# Patient Record
Sex: MALE | Race: BLACK OR AFRICAN AMERICAN | NOT HISPANIC OR LATINO
[De-identification: names, ages, dates, MRNs, and addresses within clinical notes are randomized per-mention and may not be internally consistent; named-entity substitution may affect disease eponyms.]

---
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
## 2024-02-05 ENCOUNTER — APPOINTMENT (OUTPATIENT)
Dept: INTERNAL MEDICINE | Facility: CLINIC | Age: 19
End: 2024-02-05

## 2024-02-05 ENCOUNTER — OUTPATIENT (OUTPATIENT)
Dept: OUTPATIENT SERVICES | Facility: HOSPITAL | Age: 19
LOS: 1 days | End: 2024-02-05

## 2024-05-19 ENCOUNTER — EMERGENCY (EMERGENCY)
Facility: HOSPITAL | Age: 19
LOS: 0 days | Discharge: HOME | End: 2024-05-20
Attending: EMERGENCY MEDICINE
Payer: COMMERCIAL

## 2024-05-19 VITALS
OXYGEN SATURATION: 100 % | SYSTOLIC BLOOD PRESSURE: 142 MMHG | WEIGHT: 223.99 LBS | HEART RATE: 97 BPM | TEMPERATURE: 97 F | DIASTOLIC BLOOD PRESSURE: 90 MMHG | RESPIRATION RATE: 18 BRPM

## 2024-05-19 DIAGNOSIS — A08.4 VIRAL INTESTINAL INFECTION, UNSPECIFIED: ICD-10-CM

## 2024-05-19 DIAGNOSIS — Z91.018 ALLERGY TO OTHER FOODS: ICD-10-CM

## 2024-05-19 DIAGNOSIS — E87.6 HYPOKALEMIA: ICD-10-CM

## 2024-05-19 DIAGNOSIS — R11.2 NAUSEA WITH VOMITING, UNSPECIFIED: ICD-10-CM

## 2024-05-19 DIAGNOSIS — N43.3 HYDROCELE, UNSPECIFIED: ICD-10-CM

## 2024-05-19 DIAGNOSIS — R19.7 DIARRHEA, UNSPECIFIED: ICD-10-CM

## 2024-05-19 DIAGNOSIS — N50.812 LEFT TESTICULAR PAIN: ICD-10-CM

## 2024-05-19 LAB
ALBUMIN SERPL ELPH-MCNC: 4.4 G/DL — SIGNIFICANT CHANGE UP (ref 3.3–5)
ALP SERPL-CCNC: 103 U/L — SIGNIFICANT CHANGE UP (ref 40–120)
ALT FLD-CCNC: 30 U/L — SIGNIFICANT CHANGE UP (ref 12–78)
ANION GAP SERPL CALC-SCNC: 7 MMOL/L — SIGNIFICANT CHANGE UP (ref 5–17)
AST SERPL-CCNC: 19 U/L — SIGNIFICANT CHANGE UP (ref 15–37)
BASOPHILS # BLD AUTO: 0.01 K/UL — SIGNIFICANT CHANGE UP (ref 0–0.2)
BASOPHILS NFR BLD AUTO: 0.2 % — SIGNIFICANT CHANGE UP (ref 0–2)
BILIRUB SERPL-MCNC: 0.7 MG/DL — SIGNIFICANT CHANGE UP (ref 0.2–1.2)
BUN SERPL-MCNC: 9 MG/DL — SIGNIFICANT CHANGE UP (ref 7–23)
CALCIUM SERPL-MCNC: 9.7 MG/DL — SIGNIFICANT CHANGE UP (ref 8.5–10.1)
CHLORIDE SERPL-SCNC: 106 MMOL/L — SIGNIFICANT CHANGE UP (ref 96–108)
CO2 SERPL-SCNC: 28 MMOL/L — SIGNIFICANT CHANGE UP (ref 22–31)
CREAT SERPL-MCNC: 1.11 MG/DL — SIGNIFICANT CHANGE UP (ref 0.5–1.3)
EGFR: 98 ML/MIN/1.73M2 — SIGNIFICANT CHANGE UP
EOSINOPHIL # BLD AUTO: 0.01 K/UL — SIGNIFICANT CHANGE UP (ref 0–0.5)
EOSINOPHIL NFR BLD AUTO: 0.2 % — SIGNIFICANT CHANGE UP (ref 0–6)
GLUCOSE SERPL-MCNC: 101 MG/DL — HIGH (ref 70–99)
HCT VFR BLD CALC: 44.9 % — SIGNIFICANT CHANGE UP (ref 39–50)
HGB BLD-MCNC: 14.7 G/DL — SIGNIFICANT CHANGE UP (ref 13–17)
IMM GRANULOCYTES NFR BLD AUTO: 0.2 % — SIGNIFICANT CHANGE UP (ref 0–0.9)
LYMPHOCYTES # BLD AUTO: 0.52 K/UL — LOW (ref 1–3.3)
LYMPHOCYTES # BLD AUTO: 9.2 % — LOW (ref 13–44)
MCHC RBC-ENTMCNC: 27.3 PG — SIGNIFICANT CHANGE UP (ref 27–34)
MCHC RBC-ENTMCNC: 32.7 GM/DL — SIGNIFICANT CHANGE UP (ref 32–36)
MCV RBC AUTO: 83.5 FL — SIGNIFICANT CHANGE UP (ref 80–100)
MONOCYTES # BLD AUTO: 0.19 K/UL — SIGNIFICANT CHANGE UP (ref 0–0.9)
MONOCYTES NFR BLD AUTO: 3.4 % — SIGNIFICANT CHANGE UP (ref 2–14)
NEUTROPHILS # BLD AUTO: 4.93 K/UL — SIGNIFICANT CHANGE UP (ref 1.8–7.4)
NEUTROPHILS NFR BLD AUTO: 86.8 % — HIGH (ref 43–77)
PLATELET # BLD AUTO: 163 K/UL — SIGNIFICANT CHANGE UP (ref 150–400)
POTASSIUM SERPL-MCNC: 3.3 MMOL/L — LOW (ref 3.5–5.3)
POTASSIUM SERPL-SCNC: 3.3 MMOL/L — LOW (ref 3.5–5.3)
PROT SERPL-MCNC: 8.2 GM/DL — SIGNIFICANT CHANGE UP (ref 6–8.3)
RBC # BLD: 5.38 M/UL — SIGNIFICANT CHANGE UP (ref 4.2–5.8)
RBC # FLD: 12.3 % — SIGNIFICANT CHANGE UP (ref 10.3–14.5)
SODIUM SERPL-SCNC: 141 MMOL/L — SIGNIFICANT CHANGE UP (ref 135–145)
WBC # BLD: 5.67 K/UL — SIGNIFICANT CHANGE UP (ref 3.8–10.5)
WBC # FLD AUTO: 5.67 K/UL — SIGNIFICANT CHANGE UP (ref 3.8–10.5)

## 2024-05-19 PROCEDURE — 96374 THER/PROPH/DIAG INJ IV PUSH: CPT | Mod: XU

## 2024-05-19 PROCEDURE — 93975 VASCULAR STUDY: CPT

## 2024-05-19 PROCEDURE — 36415 COLL VENOUS BLD VENIPUNCTURE: CPT

## 2024-05-19 PROCEDURE — 99284 EMERGENCY DEPT VISIT MOD MDM: CPT

## 2024-05-19 PROCEDURE — 81003 URINALYSIS AUTO W/O SCOPE: CPT

## 2024-05-19 PROCEDURE — 87086 URINE CULTURE/COLONY COUNT: CPT

## 2024-05-19 PROCEDURE — 76870 US EXAM SCROTUM: CPT

## 2024-05-19 PROCEDURE — 85025 COMPLETE CBC W/AUTO DIFF WBC: CPT

## 2024-05-19 PROCEDURE — 99285 EMERGENCY DEPT VISIT HI MDM: CPT | Mod: 25

## 2024-05-19 PROCEDURE — 80053 COMPREHEN METABOLIC PANEL: CPT

## 2024-05-19 RX ORDER — SODIUM CHLORIDE 9 MG/ML
1000 INJECTION INTRAMUSCULAR; INTRAVENOUS; SUBCUTANEOUS ONCE
Refills: 0 | Status: COMPLETED | OUTPATIENT
Start: 2024-05-19 | End: 2024-05-19

## 2024-05-19 RX ORDER — ONDANSETRON 8 MG/1
4 TABLET, FILM COATED ORAL ONCE
Refills: 0 | Status: COMPLETED | OUTPATIENT
Start: 2024-05-19 | End: 2024-05-19

## 2024-05-19 RX ADMIN — SODIUM CHLORIDE 1000 MILLILITER(S): 9 INJECTION INTRAMUSCULAR; INTRAVENOUS; SUBCUTANEOUS at 23:07

## 2024-05-19 RX ADMIN — ONDANSETRON 4 MILLIGRAM(S): 8 TABLET, FILM COATED ORAL at 23:06

## 2024-05-19 NOTE — ED ADULT NURSE NOTE - OBJECTIVE STATEMENT
17 y/o male pt presents to ED c/o left testicular pain x 2 weeks. pt also endorsing nausea, vomiting, diarrhea. reports brother at home with similar GI symptoms. pt denies fevers, chills, chest pain, SOB, hematuria.

## 2024-05-19 NOTE — ED PROVIDER NOTE - CARE PROVIDER_API CALL
Gordo Avery Aquiles  Urology  [address] [place] [address], [address]  [place] [address]  Phone: [phone]  Fax: [phone]  Follow Up Time:

## 2024-05-19 NOTE — ED PROVIDER NOTE - GENITOURINARY, MLM
Normal genital exam, no signs of torsion, no swelling, no rash, no penile discharge, no signs of trauma.

## 2024-05-19 NOTE — ED PROVIDER NOTE - OBJECTIVE STATEMENT
18-year-old male no significant past medical history who presents with chief complaint of left testicular pain.  Patient states that symptoms have been ongoing for 2 weeks.  Denies any trauma.  Additionally denies any rash, swelling, or previous occurrence.  Patient also endorses a few day history of nausea vomiting diarrhea, brother with similar symptoms.  Denies fever, chills, or any other symptoms at this time.  No medication taken prior to arrival.

## 2024-05-19 NOTE — ED PROVIDER NOTE - CLINICAL SUMMARY MEDICAL DECISION MAKING FREE TEXT BOX
Ultrasound demonstrates small bilateral hydroceles, no acute findings.  Labs demonstrate mild hypokalemia 3.3 to be repleted.  Patient received IV fluids, Zofran in department with improvement in symptoms.

## 2024-05-19 NOTE — ED PROVIDER NOTE - NSFOLLOWUPINSTRUCTIONS_ED_ALL_ED_FT
Scrotal Pain    WHAT YOU NEED TO KNOW:    What do I need to know about scrotal pain? Scrotal pain can happen at any age. The cause of scrotal pain can range from a minor injury to a serious medical condition. It is very important to seek immediate care if you have scrotal pain. The pain may be a warning sign of a serious condition that will need treatment. Without immediate care, you may be at increased risk for losing a testicle or being sterile (not having children).    What may cause scrotal pain?    Torsion (twisting) of the testicle, cord that carries sperm from the testicle, or tissue attached to the testicle    An infection of the testicle or other area in the scrotum    A hydrocele (fluid buildup around the testicle) or varicocele (blood backup in veins in the scrotum)    An inguinal hernia (tissue pushed out of place in your groin)    Rubio gangrene (tissue death of the area between the scrotum and anus)    A urinary tract infection or stone that is passing, or an infected appendix    An injury in your groin or scrotum  What are the warning signs of a serious medical problem? Seek care immediately if you have any of the following:    Pain that starts suddenly or is severe    Swelling in your scrotum or groin, especially if you also have severe pain or are vomiting    Red or black patches of skin on your scrotum or area between your penis and anus    Blisters anywhere in your groin or scrotum    A fever  How is the cause of scrotal pain diagnosed? Your healthcare provider will examine you and ask about your pain. Tell the provider when the pain started and how long it lasts. Your provider will ask if pain started in another area and moved to your scrotum. The pain may also have moved from your scrotum to another area. Tell your provider if you have pain during exercise or if you had an injury to your groin. Also tell your provider if you have any problems urinating or if any discharge came out of your penis. Your provider may also ask about your sexual activity.    Blood tests may be used to check for signs of infection.    Ultrasound pictures may show a problem with your testicles or tissues in your scrotum. An ultrasound may also show kidney stones or other problems that may be causing your pain.  How is scrotal pain treated? Treatment will depend on the cause of your pain:    Prescription pain medicine may be given. Ask your healthcare provider how to take this medicine safely. Some prescription pain medicines contain acetaminophen. Do not take other medicines that contain acetaminophen without talking to your healthcare provider. Too much acetaminophen may cause liver damage. Prescription pain medicine may cause constipation. Ask your healthcare provider how to prevent or treat constipation.    NSAIDs, such as ibuprofen, help decrease swelling, pain, and fever. This medicine is available with or without a doctor's order. NSAIDs can cause stomach bleeding or kidney problems in certain people. If you take blood thinner medicine, always ask your healthcare provider if NSAIDs are safe for you. Always read the medicine label and follow directions.    Antibiotics are used to treat a bacterial infection.    Surgery may be needed to untwist the testicle, or cord, or to remove dead or infected tissue.  What can I do to manage my symptoms?    Wear a support device, if directed. A support device, such as a jock strap, can help keep your scrotum lifted and supported. This can help decrease pain.    Apply ice to your scrotum. Ice helps decrease pain and swelling. Use an ice pack, or put crushed ice in a plastic bag. Cover the pack or bag with a towel before you apply it to your scrotum. Apply ice for 15 to 20 minutes every hour, or as directed.  When should I seek immediate care?    You have any warning signs of a serious problem.    You have pain or swelling that starts or gets worse quickly.    You have skin changes in your scrotum, such as a dark patch.    You have a fever.  When should I contact my healthcare provider?    Your pain does not get better, even after you take pain medicine.    You have new or worsening pain.    You have questions or concerns about your condition or care.  CARE AGREEMENT:    You have the right to help plan your care. Learn about your health condition and how it may be treated. Discuss treatment options with your healthcare providers to decide what care you want to receive. You always have the right to refuse treatment.    © [employer] 1973, 2024    	  back to top            © [employer] 1973, 2024

## 2024-05-19 NOTE — ED ADULT TRIAGE NOTE - CHIEF COMPLAINT QUOTE
complains of intermittent testicular pain for past 2 weeks. denies injury. reports pain randomly comes and goes. not painful to touch. no swelling

## 2024-05-19 NOTE — ED PROVIDER NOTE - PATIENT PORTAL LINK FT
You can access the FollowMyHealth Patient Portal offered by [facility] by registering at the following website: [email or web address]. By joining [employer]’s FollowMyHealth portal, you will also be able to view your health information using other applications (apps) compatible with our system.

## 2024-05-19 NOTE — ED ADULT NURSE NOTE - SUICIDE SCREENING QUESTION 3
Follow-up - Radiation Oncology   Sabra Kapadia 1938 79 y o  female [id number]      History of Present Illness   Cancer Staging  Malignant neoplasm of upper-outer quadrant of left breast in female, estrogen receptor positive ([facility] [address] )  Staging form: Breast, AJCC 8th Edition  - Pathologic: Stage IIIA (pT4b, pN1a, cM0, G2, ER+, KY+, HER2-) - Unsigned  Method of lymph node assessment: Axillary lymph node dissection  Histologic grading system: 3 grade system      Sabra Kapadia is a 79y o  year old female with a history of left breast cancer   Status post lumpectomy and adjuvant radiation in 1992, followed by local recurrence in 1994 for which she underwent mastectomy, lymph node dissection but no additional radiation  In August 2020, she was found to have left chest wall recurrence and underwent surgical resection on 11/17/2020 revealing pT4b N1a M0 disease, ER positive KY positive HER2 negative, with a focally positive anterior subcutaneous margin  She was referred for additional adjuvant radiation therapy and was seen initially in consultation in January 2021  However, due to complications with persistent seroma requiring multiple drains, she never initiated treatment  She presents today having her last drain removed approximately 3 weeks ago  Interval history as below:      Interval History:  Patient returns to radiation oncology for limited consult and CT simulation for recurrent left breast cancer  Last consult with Dr Holly Oden on 1/14/21    79year old female with stage IIIA left breast cancer status post resection and axillary lymph node dissection  She had left breast cancer in 1992 status post lumpectomy and adjuvant radiation followed by mastectomy, lymph node dissection and reconstruction in 1994  No further radiation at that time per patient    Pathology from most recent resection is significant for +3/5 lymph nodes with GRICELDA and positive anterior subcutaneous margin in the 9-12:00 position and close but negative deep margin  Tumor cells are ER/FL (+) and [id number] (-)  Aromatase inhibitor therapy planned  Dr Jasson Paez offered the patient adjuvant radiation to the chest wall and draining lymph nodes  Plan a dose of 45-50Gy with possible boost to (+) margin areas to 60-66Gy depending on normal tissue tolerance and ability to locate this region  2/8/21 Pt attempted CT simulation but unable to raise left arm, she was referred to Physical therapy and asked to return when ROM improved  2/17/21 PT evaluation for LCW edema, lymphedema, seroma, decreased left shoulder ROM  Plan for PT 2x/week 2/17 - 4/17/21    3/4/21 Dr Javire Espinoza - aspirated 400 cc serosang fluid    3/22/21 Dr Javier Espinoza - recurrent seroma 80 cc aspirated  Refer to IR for drain placement  4/1/21 IR - Drain placed for left breast recurrent seroma  300 mL drained  4/23/21 IR - drain removal    5/14/21 IR - drain placement     6/3/21 IR - drain repositioned    6/21/21 Dr Dm Julien follow-up  Currently on adjuvant hormonal therapy with anastrozole with excellent tolerance  7/7/21 IR - drain tube check -  There remains small seroma cavity  Drainage catheter kept in place  7/19/21 Dr Javier Espinoza -   Still has her IR drain that was putting out small amounts of serous fluid  Hopefully she will have the drain removed quickly and can start radiation therapy  Otherwise she has no complaints referable to her breast   She has good range of motion    Will see her back in 6 months     7/27/21 IR - drain removed         12/20/21 Dr Dm Julien follow-up  1/19/22 Dr Javier Espinoza follow-up      Historical Information   Oncology History   Malignant neoplasm of upper-outer quadrant of left breast in female, estrogen receptor positive ([facility] [address] )   1992 Initial Diagnosis    Left breast cancer  Lumpectomy  RT      1994 Surgery    Left breast cancer recurrence  Mastectomy with immediate reconstruction     8/31/2020 Biopsy    Left breast ultrasound-guided biopsy  A  1 - 2 o'clock  Invasive mammary carcinoma of no special type   Grade 2    OK 90  HER2 1+  Lymphovascular invasion not identified    B  Left axillary lymph node  Invasive mammary carcinoma of no special type  Grade 2    Concordant  No residual lymph node is identified in specimen B, favoring axillary extension of invasive carcinoma over lymph node metastasis  Breast mass measured 2 8cm on US and involved skin  Axillary mass measured 1 4cm  Right clear       11/17/2020 Surgery    Left breast ultrasound-guided lumpectomy with HUGO  directed axillary dissection  Invasive carcinoma of no special type (ductal)  Grade 2  3 2 cm  Invasive carcinoma directly invades into the dermis or epidermis with skin ulceration  Carcinoma invades skeletal muscle  Lymphovascular invasion present  Anterior margin positive for invasive carcinoma  3/5 Lymph nodes with macrometastases (1 6 cm)  Anatomic Stage IIIB  Prognostic Stage IIIA    Immediate reconstruction with Dr Evin Holley (implant removal and local flap)     12/21/2020 -  Hormone Therapy    Anastrozole 1 mg daily  Dr Guadalupe Trevino     [facility]   12/1/2020 Initial Diagnosis    Carcinoma of left breast metastatic to axillary lymph node [facility])         Past Medical History:   Diagnosis Date    Anxiety     Asthma     Atrial fibrillation ([facility] [address] )     Breast cancer ([facility] [address] )     Chest pain, unspecified     CHF (congestive heart failure) ([facility] [address] )     Diabetes mellitus ([facility] [address] )     GERD (gastroesophageal reflux disease)     History of radiation exposure 1992    Hyperlipidemia     Hypertension     Irregular heart beat     Afib    Migraine     Obesity     Pericardial effusion     Pericarditis      Past Surgical History:   Procedure Laterality Date    ABDOMINAL ADHESION SURGERY      APPENDECTOMY      AUGMENTATION MAMMAPLASTY Left 1994    BACK SURGERY      BREAST LUMPECTOMY Left 1992    malignant    BREAST LUMPECTOMY Left 11/17/2020 Procedure: BREAST ULTRASOUND DIRECTED LUMPECTOMY (ULTRASOUND AT 1200);   Surgeon: Maria Victoria Ardian MD;  [facility];  Service: Surgical Oncology    BREAST SURGERY      CARDIAC SURGERY      Pericardial window    CHOLECYSTECTOMY      EYE SURGERY      FLAP LOCAL TRUNK Left 11/17/2020    Procedure: FLAP LOCAL TRUNK;  Surgeon: Sameer Koch MD;  [facility];  Service: Plastics    HYSTERECTOMY      IR DRAINAGE TUBE CHECK WITH SCLEROSIS  4/23/2021    IR DRAINAGE TUBE CHECK WITH SCLEROSIS  6/3/2021    IR DRAINAGE TUBE CHECK WITH SCLEROSIS  6/17/2021    IR DRAINAGE TUBE CHECK WITH SCLEROSIS  6/28/2021    IR DRAINAGE TUBE CHECK WITH SCLEROSIS  7/7/2021    IR DRAINAGE TUBE CHECK WITH SCLEROSIS  7/27/2021    IR DRAINAGE TUBE PLACEMENT  4/1/2021    IR DRAINAGE TUBE PLACEMENT WITH SCLEROSIS  5/14/2021    LYMPH NODE DISSECTION Left 11/17/2020    Procedure: HUGO  DIRECTED AXILLARY DISSECTION;  Surgeon: Maria Victoria Adrina MD;  [facility];  Service: Surgical Oncology    MASTECTOMY Left 1994    malignant    NE CELESTE-IMPLANT CAPSULECTOMY BREAST COMPLETE Left 11/17/2020    Procedure: BREAST CAPSULECTOMY;  Surgeon: Sameer Koch MD;  [facility];  Service: Plastics    NE REMOVAL INTACT BREAST IMPLANT Left 11/17/2020    Procedure: BREAST IMPLANT REMOVAL;  Surgeon: Sameer Koch MD;  [facility];  Service: Plastics    US BREAST NEEDLE LOC LEFT Left 11/2/2020    US GUIDED BREAST BIOPSY LEFT COMPLETE Left 8/31/2020    US GUIDED BREAST LYMPH NODE BIOPSY LEFT Left 8/31/2020    WRIST SURGERY         Social History   Social History     Substance and Sexual Activity   Alcohol Use Yes    Comment: Twice a month     Social History     Substance and Sexual Activity   Drug Use No     Social History     Tobacco Use   Smoking Status Never Smoker   Smokeless Tobacco Never Used         Meds/Allergies     Current Outpatient Medications:     anastrozole (ARIMIDEX) 1 mg tablet, Take 1 tablet (1 mg total) by mouth daily, Disp: 90 tablet, Rfl: 3    EPINEPHrine (EPIPEN 2-SILVIO) 0 3 mg/0 3 mL SOAJ, Inject 0 3 mL as directed, Disp: , Rfl:     FLOVENT  MCG/ACT inhaler, INHALE 2 PUFF BY INHALATION ROUTE 2 TIMES EVERY DAY, Disp: , Rfl: 6    hydrocodone-chlorpheniramine polistirex (TUSSIONEX) 10-8 mg/5 mL ER suspension, Take 5 mL by mouth every 12 (twelve) hours, Disp: , Rfl:     hydrocortisone 2 5 % cream, APPLY TO THE AFFECTED AREA TWICE DAILY  ([address], [address]), Disp: , Rfl:     insulin aspart (NOVOLOG FLEXPEN) 100 Units/mL injection pen, Novolog Flexpen U-100 Insulin aspart 100 unit/mL (3 mL) subcutaneous, Disp: , Rfl:     Insulin Glargine (TOUJEO SOLOSTAR) 300 units/mL CONCETRATED U-300 injection pen, Toujeo SoloStar U-300 Insulin 300 unit/mL (1 5 mL) subcutaneous pen  INJECT 67 UNITS BY SUBCUTANEOUS ROUTE AS PER INSULIN PROTOCOL, Disp: , Rfl:     lansoprazole (PREVACID) 30 mg capsule, lansoprazole 30 mg capsule,delayed release, Disp: , Rfl:     Lidocaine 5 % CREA, Apply 1 application topically as needed (pain), Disp: 1 Tube, Rfl: 0    metoprolol tartrate (LOPRESSOR) 50 mg tablet, Take 50 mg by mouth every 12 (twelve) hours , Disp: , Rfl:     morphine (MS CONTIN) 15 mg 12 hr tablet, Take 15 mg by mouth every 12 (twelve) hours as needed for severe pain, Disp: , Rfl:     morphine (MSIR) 15 mg tablet, Take 15 mg by mouth every 8 (eight) hours as needed for severe pain , Disp: , Rfl:     NON FORMULARY, Domperidone 20 mg TID, Disp: , Rfl:     NOVOFINE 32G X 6 MM MISC, 2 (two) times a day As directed, Disp: , Rfl: 5    nystatin (MYCOSTATIN) cream, APPLY TO AFFECTED AREAS TWICE A DAY  (MIX WITH HYDROCORTISONE), Disp: , Rfl:     ONE TOUCH ULTRA TEST test strip, TEST TWICE DAILY OR AS DIRECTED DX: E11 9, Disp: , Rfl: 5    ONETOUCH DELICA LANCETS 16T MISC, TEST TWICE DAILY OR AS DIRECTED, Disp: , Rfl: 5    oxybutynin (DITROPAN XL) 15 MG 24 hr tablet, Take 15 mg by mouth daily  , Disp: , Rfl:     Probiotic Product (ALIGN) 4 MG CAPS, Take by mouth, Disp: , Rfl:     triamcinolone (KENALOG) 0 1 % cream, , Disp: , Rfl:     VENTOLIN  (90 Base) MCG/ACT inhaler, , Disp: , Rfl:     amoxicillin (AMOXIL) 500 mg capsule, Take 500 mg by mouth every 8 (eight) hours (Patient not taking: Reported on 8/31/2021), Disp: , Rfl:     lidocaine-prilocaine (EMLA) cream, APPLY FOR 12 HOURS, AND THEN OFF 12 HOURS AS NEEDED, Disp: , Rfl: 2    oxyCODONE-acetaminophen (PERCOCET) 5-325 mg per tablet, , Disp: , Rfl:     simvastatin (ZOCOR) 20 mg tablet, Take 20 mg by mouth daily at bedtime  , Disp: , Rfl:     sodium chloride, PF, 0 9 %, 10 mL by Intracatheter route daily Intracatheter flushing daily (Patient not taking: Reported on 8/31/2021), Disp: 300 mL, Rfl: 0  Allergies   Allergen Reactions    Betadine [Povidone Iodine] Anaphylaxis    Iodinated Diagnostic Agents Anaphylaxis    Iodine - Food Allergy Anaphylaxis and Other (See Comments)    Aspirin GI Bleeding    Caffeine - Food Allergy Palpitations         Review of Systems   Review of Systems   Constitutional: Negative  HENT: Negative  Eyes: Negative  Respiratory: Positive for shortness of breath (with exertion)  Cardiovascular: Negative  Endocrine: Negative  Genitourinary: Negative  Musculoskeletal: Positive for back pain  Skin:        Healed scar LCW, edema improved, ROM improved in left arm   Allergic/Immunologic: Negative  Neurological: Negative  Hematological: Negative  Psychiatric/Behavioral: Negative  OBJECTIVE:   Pulse 70   Temp 97 7 °F (36 5 °C) (Temporal)   Resp 18   SpO2 97%   Pain Assessment:  0  Karnofsky: 90 - Able to carry on normal activity; minor signs or symptoms of disease     Physical Exam     The patient presents today no apparent distress  Sclera anicteric  No palpable cervical or supraclavicular lymphadenopathy  Normal respiratory effort  Right breast within normal limits    The patient is status post left mastectomy  The left chest wall appears well healed with mild subcutaneous fibrosis  No visible or palpable suspicious findings  No palpable residual seroma  No axillary lymphadenopathy  No lymphedema  Normal speech  Normal affect  RESULTS    Lab Results: No results found for this or any previous visit (from the past 672 hour(s))  Imaging Studies:No results found  Assessment/Plan:  No orders of the defined types were placed in this encounter  Jose Carlos Quigley is a 79y o  year old female with a history of left breast cancer status post lumpectomy and adjuvant radiation in 1992, followed by local recurrence in 1994 for which she underwent mastectomy, lymph node dissection but no additional radiation  In August 2020, she was found to have left chest wall recurrence and underwent surgical resection with local flap repair on 11/17/2020 revealing pT4b N1a M0 disease, ER positive ND positive HER2 negative, with a focally positive anterior subcutaneous margin  She was referred for additional adjuvant radiation therapy and was seen initially in consultation in January 2021  However, due to complications with persistent seroma requiring multiple drains, she never initiated treatment  She presents today having her last drain removed approximately 3 weeks ago  She remains on anastrozole  Based on the extent of her recurrent disease, the patient certainly remains at risk for local regional recurrence  There is no clinical evidence of recurrence at this time, now 10 months since her surgery  The associated risks and toxicities of reirradiation to the left chest wall and regional lymphatics was discussed at length with the patient and her , including, not limited to, fatigue, erythema, hyperpigmentation, desquamation, subcutaneous fibrosis, rib fracture, pneumonitis, cardiac toxicity, and lymphedema    Given her prior radiation and multiple surgeries, she will be at increased risk for lymphedema, persistent left chest wall pain, rib fracture, and impaired left shoulder range of motion  While the 10 month interval between surgery and the potential start of radiation would be expected to reduce the overall effictiveness of adjuvant radiation therapy, I believe that this would still provide some degree of benefit in regards to reducing her risk of local regional recurrence, which is quite high  That being said, it has been 10 months since her surgery and there is no clinical evidence of rapid recurrence at this time and she remains on anastrozole  The patient is currently leaning against following through with additional radiation therapy, but would like to have her case discussed at our multidisciplinary breast working group in order to hear the groups recommendation  In my opinion, taking into consideration all of the above factors including her risk of recurrence, the increased risk of post radiation toxicity, and the 10 month interval since surgery, I believe that both close surveillance alone vs treatment now with re-irradiation would be reasonable options  Re-staging radiographic studies should also be considered if she ultimately decides to move forward with treatment  We will contact the patient once her case has been discussed at working group  José Morrow MD  9/79/7922,5:21 PM    Portions of the record may have been created with voice recognition software   Occasional wrong word or "sound a like" substitutions may have occurred due to the inherent limitations of voice recognition software   Read the chart carefully and recognize, using context, where substitutions have occurred  No

## 2024-05-20 LAB
APPEARANCE UR: CLEAR — SIGNIFICANT CHANGE UP
BILIRUB UR-MCNC: NEGATIVE — SIGNIFICANT CHANGE UP
COLOR SPEC: YELLOW — SIGNIFICANT CHANGE UP
DIFF PNL FLD: NEGATIVE — SIGNIFICANT CHANGE UP
GLUCOSE UR QL: NEGATIVE MG/DL — SIGNIFICANT CHANGE UP
KETONES UR-MCNC: ABNORMAL MG/DL
LEUKOCYTE ESTERASE UR-ACNC: NEGATIVE — SIGNIFICANT CHANGE UP
NITRITE UR-MCNC: NEGATIVE — SIGNIFICANT CHANGE UP
PH UR: 6 — SIGNIFICANT CHANGE UP (ref 5–8)
PROT UR-MCNC: NEGATIVE MG/DL — SIGNIFICANT CHANGE UP
SP GR SPEC: 1.01 — SIGNIFICANT CHANGE UP (ref 1–1.03)
UROBILINOGEN FLD QL: 1 MG/DL — SIGNIFICANT CHANGE UP (ref 0.2–1)

## 2024-05-20 PROCEDURE — 76870 US EXAM SCROTUM: CPT | Mod: 26

## 2024-05-20 PROCEDURE — 93975 VASCULAR STUDY: CPT | Mod: 26

## 2024-05-20 RX ORDER — ONDANSETRON 8 MG/1
1 TABLET, FILM COATED ORAL
Qty: 28 | Refills: 0
Start: 2024-05-20 | End: 2024-05-26

## 2024-05-20 RX ORDER — POTASSIUM CHLORIDE 20 MEQ
20 PACKET (EA) ORAL ONCE
Refills: 0 | Status: COMPLETED | OUTPATIENT
Start: 2024-05-20 | End: 2024-05-20

## 2024-05-20 RX ADMIN — Medication 20 MILLIEQUIVALENT(S): at 01:27

## 2024-05-21 LAB
CULTURE RESULTS: NO GROWTH — SIGNIFICANT CHANGE UP
SPECIMEN SOURCE: SIGNIFICANT CHANGE UP

## 2024-06-04 ENCOUNTER — APPOINTMENT (OUTPATIENT)
Dept: DERMATOLOGY | Facility: CLINIC | Age: 19
End: 2024-06-04
Payer: COMMERCIAL

## 2024-06-04 DIAGNOSIS — L30.9 DERMATITIS, UNSPECIFIED: ICD-10-CM

## 2024-06-04 DIAGNOSIS — L70.0 ACNE VULGARIS: ICD-10-CM

## 2024-06-04 PROCEDURE — 99214 OFFICE O/P EST MOD 30 MIN: CPT

## 2024-06-04 RX ORDER — CLINDAMYCIN PHOSPHATE 1 G/10ML
1 GEL TOPICAL
Qty: 30 | Refills: 8 | Status: ACTIVE | COMMUNITY
Start: 2024-06-04 | End: 1900-01-01

## 2024-06-04 NOTE — ASSESSMENT
[FreeTextEntry1] : dupixent cont dupixent q2 weeks. SED  acne clinda gel PRN; SED  xerosis cerave products  f/u 1 year

## 2024-06-04 NOTE — PHYSICAL EXAM
[Alert] : alert [Oriented x 3] : ~L oriented x 3 [Well Nourished] : well nourished [Conjunctiva Non-injected] : conjunctiva non-injected [No Visual Lymphadenopathy] : no visual  lymphadenopathy [No Clubbing] : no clubbing [No Edema] : no edema [No Bromhidrosis] : no bromhidrosis [No Chromhidrosis] : no chromhidrosis [FreeTextEntry3] : few eczematous patches, hyperpigmentation  acne

## 2024-06-04 NOTE — HISTORY OF PRESENT ILLNESS
[FreeTextEntry1] : f/u eczema [de-identified] : 18 year old here, hx of atopic derm on dupixent. tolerating well. acne on face.

## 2024-06-11 RX ORDER — DUPILUMAB 300 MG/2ML
300 INJECTION, SOLUTION SUBCUTANEOUS
Qty: 1 | Refills: 11 | Status: ACTIVE | COMMUNITY
Start: 2022-01-31 | End: 1900-01-01

## 2024-07-22 ENCOUNTER — APPOINTMENT (OUTPATIENT)
Dept: UROLOGY | Facility: CLINIC | Age: 19
End: 2024-07-22

## 2024-08-06 ENCOUNTER — APPOINTMENT (OUTPATIENT)
Dept: UROLOGY | Facility: CLINIC | Age: 19
End: 2024-08-06

## 2024-08-06 PROBLEM — N43.3 BILATERAL HYDROCELE: Status: ACTIVE | Noted: 2024-08-06

## 2024-08-06 PROBLEM — S39.81XA SPORTS HERNIA: Status: ACTIVE | Noted: 2024-08-06

## 2024-08-06 PROBLEM — N50.812 PAIN IN LEFT TESTICLE: Status: ACTIVE | Noted: 2024-08-06

## 2024-08-06 PROCEDURE — 99203 OFFICE O/P NEW LOW 30 MIN: CPT

## 2024-08-13 ENCOUNTER — APPOINTMENT (OUTPATIENT)
Dept: INTERNAL MEDICINE | Facility: CLINIC | Age: 19
End: 2024-08-13

## 2024-10-24 ENCOUNTER — APPOINTMENT (OUTPATIENT)
Dept: UROLOGY | Facility: CLINIC | Age: 19
End: 2024-10-24
Payer: COMMERCIAL

## 2024-10-24 DIAGNOSIS — R10.32 LEFT LOWER QUADRANT PAIN: ICD-10-CM

## 2024-10-24 PROCEDURE — 99213 OFFICE O/P EST LOW 20 MIN: CPT

## 2024-11-11 ENCOUNTER — OUTPATIENT (OUTPATIENT)
Dept: OUTPATIENT SERVICES | Facility: HOSPITAL | Age: 19
LOS: 1 days | End: 2024-11-11
Payer: COMMERCIAL

## 2024-11-11 ENCOUNTER — APPOINTMENT (OUTPATIENT)
Dept: CT IMAGING | Facility: CLINIC | Age: 19
End: 2024-11-11
Payer: COMMERCIAL

## 2024-11-11 DIAGNOSIS — Z00.8 ENCOUNTER FOR OTHER GENERAL EXAMINATION: ICD-10-CM

## 2024-11-11 PROCEDURE — 72193 CT PELVIS W/DYE: CPT | Mod: 26

## 2024-11-11 PROCEDURE — 72193 CT PELVIS W/DYE: CPT

## 2025-02-05 NOTE — ASU DISCHARGE PLAN (ADULT/PEDIATRIC) - PAIN MANAGEMENT
Prescriptions electronically submitted to pharmacy from [facility] Detail Level: Simple Additional Notes: Declined biopsy at this time Render Risk Assessment In Note?: no

## 2025-02-13 ENCOUNTER — OUTPATIENT (OUTPATIENT)
Dept: OUTPATIENT SERVICES | Facility: HOSPITAL | Age: 20
LOS: 1 days | End: 2025-02-13

## 2025-02-13 ENCOUNTER — APPOINTMENT (OUTPATIENT)
Dept: INTERNAL MEDICINE | Facility: CLINIC | Age: 20
End: 2025-02-13

## 2025-06-17 ENCOUNTER — APPOINTMENT (OUTPATIENT)
Dept: DERMATOLOGY | Facility: CLINIC | Age: 20
End: 2025-06-17
Payer: COMMERCIAL

## 2025-06-17 PROCEDURE — 10040 EXTRACTION: CPT

## 2025-06-17 PROCEDURE — 99214 OFFICE O/P EST MOD 30 MIN: CPT | Mod: 25

## 2025-06-17 RX ORDER — DUPILUMAB 300 MG/2ML
300 INJECTION, SOLUTION SUBCUTANEOUS
Qty: 1 | Refills: 11 | Status: ACTIVE | COMMUNITY
Start: 2025-06-17 | End: 1900-01-01

## 2025-06-17 RX ORDER — CHLORHEXIDINE GLUCONATE 213 G/1000ML
4 SOLUTION TOPICAL
Qty: 1 | Refills: 3 | Status: ACTIVE | COMMUNITY
Start: 2025-06-17 | End: 1900-01-01

## 2025-06-17 RX ORDER — CLINDAMYCIN PHOSPHATE 10 MG/ML
1 LOTION TOPICAL
Qty: 120 | Refills: 3 | Status: ACTIVE | COMMUNITY
Start: 2025-06-17 | End: 1900-01-01

## 2025-08-19 ENCOUNTER — APPOINTMENT (OUTPATIENT)
Dept: ORTHOPEDIC SURGERY | Facility: CLINIC | Age: 20
End: 2025-08-19
Payer: COMMERCIAL

## 2025-08-19 ENCOUNTER — NON-APPOINTMENT (OUTPATIENT)
Age: 20
End: 2025-08-19

## 2025-08-19 VITALS — HEIGHT: 73 IN | WEIGHT: 200 LBS | BODY MASS INDEX: 26.51 KG/M2

## 2025-08-19 DIAGNOSIS — S80.02XA CONTUSION OF LEFT KNEE, INITIAL ENCOUNTER: ICD-10-CM

## 2025-08-19 PROCEDURE — 73564 X-RAY EXAM KNEE 4 OR MORE: CPT | Mod: 26,LT

## 2025-08-19 PROCEDURE — 99214 OFFICE O/P EST MOD 30 MIN: CPT

## 2025-08-19 RX ORDER — IBUPROFEN 600 MG/1
600 TABLET, FILM COATED ORAL 3 TIMES DAILY
Qty: 30 | Refills: 3 | Status: ACTIVE | COMMUNITY
Start: 2025-08-19 | End: 1900-01-01